# Patient Record
Sex: MALE | Race: WHITE | URBAN - METROPOLITAN AREA
[De-identification: names, ages, dates, MRNs, and addresses within clinical notes are randomized per-mention and may not be internally consistent; named-entity substitution may affect disease eponyms.]

---

## 2018-08-02 ENCOUNTER — OFFICE VISIT (OUTPATIENT)
Dept: FAMILY MEDICINE | Facility: CLINIC | Age: 38
End: 2018-08-02
Payer: COMMERCIAL

## 2018-08-02 ENCOUNTER — NURSE TRIAGE (OUTPATIENT)
Dept: NURSING | Facility: CLINIC | Age: 38
End: 2018-08-02

## 2018-08-02 VITALS
SYSTOLIC BLOOD PRESSURE: 127 MMHG | DIASTOLIC BLOOD PRESSURE: 80 MMHG | BODY MASS INDEX: 29.66 KG/M2 | TEMPERATURE: 98.7 F | HEART RATE: 60 BPM | OXYGEN SATURATION: 100 % | WEIGHT: 219 LBS | HEIGHT: 72 IN

## 2018-08-02 DIAGNOSIS — R10.30 INGUINAL PAIN, UNSPECIFIED LATERALITY: Primary | ICD-10-CM

## 2018-08-02 PROCEDURE — 99203 OFFICE O/P NEW LOW 30 MIN: CPT | Performed by: NURSE PRACTITIONER

## 2018-08-02 ASSESSMENT — PAIN SCALES - GENERAL: PAINLEVEL: NO PAIN (1)

## 2018-08-02 NOTE — TELEPHONE ENCOUNTER
"\"I was playing softball and I thought I hurt my hip.\" Inury occurring 1 month ago after \"stepping and throwing.\" Reporting left hip into left lower abdominal area. Constant low level pain that increases with movement. Afebrile. Pain increasing since Tuesday 7/31/18 after playing softball again.      Per guidelines advised to be seen with in 4 hours. Caller verbalized understanding. Denies further questions.    Bella Ledesma RN  [facility]      Reason for Disposition    [1] MILD-MODERATE pain AND [2] constant AND [3] present > 2 hours    Additional Information    Negative: Serious injury with multiple fractures    Negative: [1] Major bleeding (e.g., actively dripping or spurting) AND [2] can't be stopped    Negative: Bullet wound, stabbed by knife, or other serious penetrating wound    Negative: Looks like a dislocated joint (crooked or deformed)    Negative: Can't stand (bear weight) or walk    Negative: Sounds like a life-threatening emergency to the triager    Negative: Wound looks infected    Negative: Puncture wound of hip area    Negative: Skin is split open or gaping  (or length > 1/2 inch or 12 mm)    Negative: [1] Bleeding AND [2] won't stop after 10 minutes of direct pressure (using correct technique)    Negative: [1] Dirt in the wound AND [2] not removed with 15 minutes of scrubbing    Negative: Sounds like a serious injury to the triager    Negative: [1] SEVERE pain AND [2] not improved 2 hours after pain medicine/ice packs    Negative: Suspicious history for the injury    Negative: [1] Large swelling or bruise (> 2 inches or 5 cm) AND [2] able to bear weight    Negative: [1] High-risk adult (e.g., age > 60, osteoporosis, chronic steroid use) AND [2] limping    Negative: [1] Last tetanus shot > 5 years ago AND [2] DIRTY cut or scrape    Negative: Shock suspected (e.g., cold/pale/clammy skin, too weak to stand, low BP, rapid pulse)    Negative: Difficult to awaken or acting confused  (e.g., " "disoriented, slurred speech)    Negative: Passed out (i.e., lost consciousness, collapsed and was not responding)    Negative: Sounds like a life-threatening emergency to the triager    Negative: Chest pain    Negative: Pain is mainly in upper abdomen  (if needed ask: \"is it mainly above the belly button?\")    Negative: Followed an abdomen (stomach) injury    Negative: [1] SEVERE pain (e.g., excruciating) AND [2] present > 1 hour    Negative: [1] SEVERE pain AND [2] age > 60    Negative: [1] Vomiting AND [2] contains red blood or black (\"coffee ground\") material  (Exception: few red streaks in vomit that only happened once)    Negative: Blood in bowel movements  (Exception: Blood on surface of BM with constipation)    Negative: Black or tarry bowel movements  (Exception: chronic-unchanged  black-grey bowel movements AND is taking iron pills or Pepto-bismol)    Negative: [1] Unable to urinate (or only a few drops) > 4 hours AND     [2] bladder feels very full (e.g., palpable bladder or strong urge to urinate)    Negative: [1] Pain in the scrotum or testicle AND [2] present > 1 hour    Negative: Patient sounds very sick or weak to the triager    Protocols used: ABDOMINAL PAIN - MALE-ADULT-, HIP INJURY-ADULT-      "

## 2018-08-02 NOTE — PROGRESS NOTES
"HPI    SUBJECTIVE:   Cj Brown is a 37 year old male who presents to clinic today for the following health issues:      Abdominal Pain      Duration: 1 month    Description (location/character/radiation): pelvic area       Associated flank pain: None    Intensity:  moderate    Accompanying signs and symptoms:        Fever/Chills: no        Gas/Bloating: no        Nausea/vomitting: no        Diarrhea: no        Dysuria or Hematuria: no     History (previous similar pain/trauma/previous testing): stretched wrong playing soft ball    Precipitating or alleviating factors:       Pain worse with eating/BM/urination: none       Pain relieved by BM: no     Therapies tried and outcome: None    LMP:  not applicable    A month ago, injured his hip while playing softball   Running, stepped on a base and threw the ball, injured left hip  Aggravated by running   Played softball again on Tuesday night and it hurts worse now   Hasn't tried anything to make it better except repositioning   Can't pick leg up while laying flat   Left hip and lower abdominal pain   No nausea, fevers   No changes in bowel movements   Although tries not to strain d/t pain   Urinating normally   No physical changes in the area   No back pain   No pain in left leg but leg feels weak while walking       No past medical history on file.  No family history on file.  No past surgical history on file.  Social History   Substance Use Topics     Smoking status: Never Smoker     Smokeless tobacco: Never Used     Alcohol use Yes     No current outpatient prescriptions on file.     No Known Allergies    Reviewed and updated as needed this visit by clinical staff and provider     ROS  Detailed as above     /80 (BP Location: Right arm, Cuff Size: Adult Regular)  Pulse 60  Temp 98.7  F (37.1  C) (Tympanic)  Ht 5' 11.5\" (1.816 m)  Wt 219 lb (99.3 kg)  SpO2 100%  BMI 30.12 kg/m2      Physical Exam   Constitutional: He is oriented to person, place, and " time and well-developed, well-nourished, and in no distress.   HENT:   Head: Normocephalic.   Eyes: Conjunctivae are normal.   Neck: Normal range of motion.   Cardiovascular: Normal rate.    Pulmonary/Chest: Effort normal.   Abdominal: Soft. Bowel sounds are normal. He exhibits no distension. There is tenderness (right and left inguinal regions). There is no rebound and no guarding.   Neurological: He is alert and oriented to person, place, and time.   Skin: Skin is warm and dry.   Psychiatric: Mood and affect normal.           Assessment and Plan:       ICD-10-CM    1. Inguinal pain, unspecified laterality R10.30 ORTHOPEDICS ADULT REFERRAL       Musculoskeletal pain since softball injury ~1 month ago. No lumps or masses on exam. Recommend evaluation by Orthopedics. Call or return to clinic if questions or worsening symptoms.       Pt seen in conjunction with Katarina Montes, [employer] students.      Aleksandra Leong, APRN, CNP  [facility]

## 2018-08-02 NOTE — MR AVS SNAPSHOT
After Visit Summary   8/2/2018    Cj Brown    MRN: [id number]           Patient Information     Date Of Birth          1980        Visit Information        Provider Department      8/2/2018 10:00 AM Aleksandra Leong APRN CNP [facility]        Today's Diagnoses     Inguinal pain, unspecified laterality    -  1       Follow-ups after your visit        Additional Services     ORTHOPEDICS ADULT REFERRAL       Your provider has referred you to:   FMG: [facility] [phone]   [email or web address]  [facility]: [facility] [place] [facility]  [phone]   [email or web address]/  [place] [place] -  [facility]  [phone]   [email or web address]/    Please be aware that coverage of these services is subject to the terms and limitations of your health insurance plan.  Call member services at your health plan with any benefit or coverage questions.      Please bring the following to your appointment:    >>   Any x-rays, CTs or MRIs which have been performed.  Contact the facility where they were done to arrange for  prior to your scheduled appointment.    >>   List of current medications   >>   This referral request   >>   Any documents/labs given to you for this referral                  Who to contact     If you have questions or need follow up information about today's clinic visit or your schedule please contact [facility] directly at [phone].  Normal or non-critical lab and imaging results will be communicated to you by MyChart, letter or phone within 4 business days after the clinic has received the results. If you do not hear from us within 7 days, please contact the clinic through MyChart or phone. If you have a  "critical or abnormal lab result, we will notify you by phone as soon as possible.  Submit refill requests through [employer] or call your pharmacy and they will forward the refill request to us. Please allow 3 business days for your refill to be completed.          Additional Information About Your Visit        MyChart Information     [employer] lets you send messages to your doctor, view your test results, renew your prescriptions, schedule appointments and more. To sign up, go to [email or web address] . Click on \"Log in\" on the left side of the screen, which will take you to the Welcome page. Then click on \"Sign up Now\" on the right side of the page.     You will be asked to enter the access code listed below, as well as some personal information. Please follow the directions to create your username and password.     Your access code is: [email or web address]  Expires: 10/31/2018  9:58 AM     Your access code will  in 90 days. If you need help or a new code, please call your [place] clinic or [phone].        Care EveryWhere ID     This is your [facility] EveryWhere ID. This could be used by other organizations to access your [place] medical records  [id number]        Your Vitals Were     Pulse Temperature Height Pulse Oximetry BMI (Body Mass Index)       60 98.7  F (37.1  C) (Tympanic) 5' 11.5\" (1.816 m) 100% 30.12 kg/m2        Blood Pressure from Last 3 Encounters:   18 127/80    Weight from Last 3 Encounters:   18 219 lb (99.3 kg)              We Performed the Following     ORTHOPEDICS ADULT REFERRAL        Primary Care Provider Office Phone # Fax #    [facility] [phone] [address] [place] [address]        Equal Access to Services     MAURY VIVEROS : Hadii kevan Bueno, waaxda luqadaha, qaybta kaalmada harperyajuanjose, dalton quinones. So [facility] [phone].    ATENCIÓN: Si habla español, tiene a nunes disposición servicios " alejo de asistencia lingüística. Brittany packer [phone].    We comply with applicable federal civil rights laws and [place] laws. We do not discriminate on the basis of race, color, national origin, age, disability, sex, sexual orientation, or gender identity.            Thank you!     Thank you for choosing [facility]  for your care. Our goal is always to provide you with excellent care. Hearing back from our patients is one way we can continue to improve our services. Please take a few minutes to complete the written survey that you may receive in the mail after your visit with us. Thank you!             Your Updated Medication List - Protect others around you: Learn how to safely use, store and throw away your medicines at www.disposemymeds.org.      Notice  As of 8/2/2018 10:39 AM    You have not been prescribed any medications.

## 2018-08-06 ENCOUNTER — RADIANT APPOINTMENT (OUTPATIENT)
Dept: GENERAL RADIOLOGY | Facility: CLINIC | Age: 38
End: 2018-08-06
Attending: PEDIATRICS
Payer: COMMERCIAL

## 2018-08-06 ENCOUNTER — OFFICE VISIT (OUTPATIENT)
Dept: ORTHOPEDICS | Facility: CLINIC | Age: 38
End: 2018-08-06
Payer: COMMERCIAL

## 2018-08-06 VITALS
DIASTOLIC BLOOD PRESSURE: 81 MMHG | WEIGHT: 219 LBS | HEIGHT: 72 IN | BODY MASS INDEX: 29.66 KG/M2 | SYSTOLIC BLOOD PRESSURE: 126 MMHG

## 2018-08-06 DIAGNOSIS — M25.552 PAIN IN JOINT INVOLVING LEFT PELVIC REGION AND THIGH: ICD-10-CM

## 2018-08-06 DIAGNOSIS — K40.90 UNILATERAL INGUINAL HERNIA WITHOUT OBSTRUCTION OR GANGRENE, RECURRENCE NOT SPECIFIED: ICD-10-CM

## 2018-08-06 DIAGNOSIS — R10.32 LEFT GROIN PAIN: Primary | ICD-10-CM

## 2018-08-06 PROCEDURE — 73502 X-RAY EXAM HIP UNI 2-3 VIEWS: CPT

## 2018-08-06 PROCEDURE — 99204 OFFICE O/P NEW MOD 45 MIN: CPT | Performed by: PEDIATRICS

## 2018-08-06 NOTE — PROGRESS NOTES
"Sports Medicine Clinic Visit    PCP: Cassandra, [facility]    Cj Brown is a 37 year old male who is seen  in consultation at the request of  Aleksandra Leong C.N.P. presenting with left hip pain    Injury: Patient reports pain started when stepping to thrown in baseball game.  He reports his externally rotated while throwing.  His pain is now in his lower abdomen and groin, increases with any movement, a lot worse with coughing and sneezing.  - Also worsened recently with another throw when trying to return to softball.  - Denies bowl or bladder complaints, no vomiting, fevers or chills    Location of Pain: left hip/ abdomen  Duration of Pain: 1 month(s)  Rating of Pain at worst: 10/10, intermittent with movement  Rating of Pain Currently: 3/10  Symptoms are better with: rest, activity avoidance   Symptoms are worse with: sprinting, sneezing, SLR with left hip  Additional Features:   Positive: popping   Negative: grinding, catching, locking and instability  Other evaluation and/or treatments so far consists of: Nothing  Prior History of related problems: none    Social History: baseball, desk job    Review of Systems  Skin: no bruising, no swelling  Musculoskeletal: as above  Neurologic: no numbness, paresthesias  Remainder of review of systems is negative including constitutional, CV, pulmonary, GI, except as noted in HPI or medical history.    Patient's current problem list, past medical and surgical history, and family history were reviewed.    There is no problem list on file for this patient.    No past medical history on file.  No past surgical history on file.  No family history on file.    Objective  /81 (BP Location: Left arm, Patient Position: Chair, Cuff Size: Adult Regular)  Ht 5' 11.5\" (1.816 m)  Wt 219 lb (99.3 kg)  BMI 30.12 kg/m2    GENERAL APPEARANCE: healthy, alert and no distress   GAIT: NORMAL  SKIN: no suspicious lesions or rashes  HEENT: Sclera clear, anicteric  CV: " no lower extremity edema, good peripheral pulses  RESP: Breathing not labored  NEURO: Normal strength and tone, mentation intact and speech normal  PSYCH:  mentation appears normal and affect normal/bright    Abdominal Exam:  - no hepatosplenomegaly or masses  - slightly tender to palpation throughout lower abdomen     Exam:  - no lumps or masses felt in testicles  - with increased abdominal pressure, hernia felt in deep inguinal ring on the left    Bilateral hip exam  Inspection:      no edema or ecchymosis in hip area    Tender:      groin left    Non Tender:      remainder of the hip area bilateral    ROM:     Full active and passive ROM  Bilateral, however, pain will all hip ROM    Strength:      flexion 3/5 left due to pain       abduction 5/5 bilateral       adduction 5/5 bilateral    Sensation:      grossly intact in hip and thigh    Radiology  I ordered, visualized and reviewed these images with the patient  Xr Pelvis And Hip Left 1 View    Result Date: 8/7/2018  PELVIS ONE VIEW AND LEFT HIP ONE VIEW  8/6/2018 5:46 PM HISTORY:  Pain. COMPARISON:  None. FINDINGS:  No fracture or osseous lesion is seen. The joint spaces are well preserved. No adjacent soft tissue pathology is seen.     IMPRESSION:  Unremarkable examination. PATRICA CORTEZ MD    Assessment:  1. Left groin pain    2. Unilateral inguinal hernia without obstruction or gangrene, recurrence not specified      Left groin pain with evidence of left inguinal hernia.  I recommend surgical referral.  Pending this evaluation could consider PT for abdominal muscle or hip flexor strain or MRI imaging of the hip, however, his symptoms are most consistent with hernia.  I discussed concerning signs and symptoms in the interim and reasons to go to the emergency room    Plan:  - Today's Plan of Care:  Referral to General Surgery    -We also discussed other future treatment options:  MRI of the Hip or Physical therapy    Follow Up: as needed    Concerning  signs and symptoms were reviewed.  The patient expressed understanding of this management plan and all questions were answered at this time.    Bella Duron MD CA  Primary Care Sports Medicine  [facility]

## 2018-08-06 NOTE — PATIENT INSTRUCTIONS
Plan:  - Today's Plan of Care:  Referral to General Surgery    -We also discussed other future treatment options:  MRI of the Hip or Physical therapy    Follow Up: as needed    If you have any further questions for your physician or physician s care team you can call [phone] and use option 3 to leave a voice message. Calls received during business hours will be returned same day.

## 2018-08-06 NOTE — MR AVS SNAPSHOT
After Visit Summary   8/6/2018    Cj Brown    MRN: [id number]           Patient Information     Date Of Birth          1980        Visit Information        Provider Department      8/6/2018 5:20 PM Bella Duron MD [facility]        Today's Diagnoses     Left groin pain    -  1    Unilateral inguinal hernia without obstruction or gangrene, recurrence not specified          Care Instructions    Plan:  - Today's Plan of Care:  Referral to General Surgery    -We also discussed other future treatment options:  MRI of the Hip or Physical therapy    Follow Up: as needed    If you have any further questions for your physician or physician s care team you can call [phone] and use option 3 to leave a voice message. Calls received during business hours will be returned same day.              Follow-ups after your visit        Additional Services     GENERAL SURG ADULT REFERRAL       Your provider has referred you to: FMG: [facility] [phone]   [email or web address]  FMG: [facility] [place] [phone]   [email or web address]/  [facility]: [facility] [place] [phone]   [email or web address]  FMG: [facility] [phone]   [email or web address]  FMG: [facility] [phone]   [email or web address]/    Please be aware that coverage of these services is subject to the terms and limitations of your health insurance plan.  Call member services at your health plan with any benefit or coverage questions.      Please bring the following with you to your appointment:    (1) Any X-Rays, CTs or MRIs which have been performed.  Contact the facility where they were done to arrange for  prior to your scheduled appointment.   (2) List of  "current medications   (3) This referral request   (4) Any documents/labs given to you for this referral                  Who to contact     If you have questions or need follow up information about today's clinic visit or your schedule please contact [facility] directly at [phone].  Normal or non-critical lab and imaging results will be communicated to you by MyChart, letter or phone within 4 business days after the clinic has received the results. If you do not hear from us within 7 days, please contact the clinic through [employer] or phone. If you have a critical or abnormal lab result, we will notify you by phone as soon as possible.  Submit refill requests through [employer] or call your pharmacy and they will forward the refill request to us. Please allow 3 business days for your refill to be completed.          Additional Information About Your Visit        MyChart Information     [employer] gives you secure access to your electronic health record. If you see a primary care provider, you can also send messages to your care team and make appointments. If you have questions, please call your primary care clinic.  If you do not have a primary care provider, please call [phone] and they will assist you.        Care EveryWhere ID     This is your Care EveryWhere ID. This could be used by other organizations to access your [place] medical records  [id number]        Your Vitals Were     Height BMI (Body Mass Index)                5' 11.5\" (1.816 m) 30.12 kg/m2           Blood Pressure from Last 3 Encounters:   08/06/18 126/81   08/02/18 127/80    Weight from Last 3 Encounters:   08/06/18 219 lb (99.3 kg)   08/02/18 219 lb (99.3 kg)              We Performed the Following     GENERAL SURG ADULT REFERRAL        Primary Care Provider Office Phone # Fax #    [facility] [phone] [address] [place] [address]        Equal Access to " Services     [facility] Hospital[facility] : Hadii kevan Bueno, wamarcda elsaadaha, qapeggy cruzaljoaquim de la o, dalton quinones. So [facility] [phone].    ATENCIÓN: Si habla español, tiene a nunes disposición servicios gratuitos de asistencia lingüística. Llame al [phone].    We comply with applicable federal civil rights laws and [place] laws. We do not discriminate on the basis of race, color, national origin, age, disability, sex, sexual orientation, or gender identity.            Thank you!     Thank you for choosing [facility] [facility]  for your care. Our goal is always to provide you with excellent care. Hearing back from our patients is one way we can continue to improve our services. Please take a few minutes to complete the written survey that you may receive in the mail after your visit with us. Thank you!             Your Updated Medication List - Protect others around you: Learn how to safely use, store and throw away your medicines at www.disposemymeds.org.      Notice  As of 8/6/2018  6:22 PM    You have not been prescribed any medications.

## 2018-08-06 NOTE — LETTER
8/6/2018         RE: Cj Brown  [id number] [place] [address]        Dear Colleague,    Thank you for referring your patient, Cj Brown, to the [facility]. Please see a copy of my visit note below.    Sports Medicine Clinic Visit    PCP: [facility]    Cj Brown is a 37 year old male who is seen  in consultation at the request of  Aleksandra Leong C.N.P. presenting with left hip pain    Injury: Patient reports pain started when stepping to thrown in baseball game.  He reports his externally rotated while throwing.  His pain is now in his lower abdomen and groin, increases with any movement, a lot worse with coughing and sneezing.  - Also worsened recently with another throw when trying to return to softball.  - Denies bowl or bladder complaints, no vomiting, fevers or chills    Location of Pain: left hip/ abdomen  Duration of Pain: 1 month(s)  Rating of Pain at worst: 10/10, intermittent with movement  Rating of Pain Currently: 3/10  Symptoms are better with: rest, activity avoidance   Symptoms are worse with: sprinting, sneezing, SLR with left hip  Additional Features:   Positive: popping   Negative: grinding, catching, locking and instability  Other evaluation and/or treatments so far consists of: Nothing  Prior History of related problems: none    Social History: baseball, desk job    Review of Systems  Skin: no bruising, no swelling  Musculoskeletal: as above  Neurologic: no numbness, paresthesias  Remainder of review of systems is negative including constitutional, CV, pulmonary, GI, except as noted in HPI or medical history.    Patient's current problem list, past medical and surgical history, and family history were reviewed.    There is no problem list on file for this patient.    No past medical history on file.  No past surgical history on file.  No family history on file.    Objective  /81 (BP Location: Left arm,  "Patient Position: Chair, Cuff Size: Adult Regular)  Ht 5' 11.5\" (1.816 m)  Wt 219 lb (99.3 kg)  BMI 30.12 kg/m2    GENERAL APPEARANCE: healthy, alert and no distress   GAIT: NORMAL  SKIN: no suspicious lesions or rashes  HEENT: Sclera clear, anicteric  CV: no lower extremity edema, good peripheral pulses  RESP: Breathing not labored  NEURO: Normal strength and tone, mentation intact and speech normal  PSYCH:  mentation appears normal and affect normal/bright    Abdominal Exam:  - no hepatosplenomegaly or masses  - slightly tender to palpation throughout lower abdomen     Exam:  - no lumps or masses felt in testicles  - with increased abdominal pressure, hernia felt in deep inguinal ring on the left    Bilateral hip exam  Inspection:      no edema or ecchymosis in hip area    Tender:      groin left    Non Tender:      remainder of the hip area bilateral    ROM:     Full active and passive ROM  Bilateral, however, pain will all hip ROM    Strength:      flexion 3/5 left due to pain       abduction 5/5 bilateral       adduction 5/5 bilateral    Sensation:      grossly intact in hip and thigh    Radiology  I ordered, visualized and reviewed these images with the patient  Xr Pelvis And Hip Left 1 View    Result Date: 8/7/2018  PELVIS ONE VIEW AND LEFT HIP ONE VIEW  8/6/2018 5:46 PM HISTORY:  Pain. COMPARISON:  None. FINDINGS:  No fracture or osseous lesion is seen. The joint spaces are well preserved. No adjacent soft tissue pathology is seen.     IMPRESSION:  Unremarkable examination. PATRICA CORTEZ MD    Assessment:  1. Left groin pain    2. Unilateral inguinal hernia without obstruction or gangrene, recurrence not specified      Left groin pain with evidence of left inguinal hernia.  I recommend surgical referral.  Pending this evaluation could consider PT for abdominal muscle or hip flexor strain or MRI imaging of the hip, however, his symptoms are most consistent with hernia.  I discussed concerning signs " and symptoms in the interim and reasons to go to the emergency room    Plan:  - Today's Plan of Care:  Referral to General Surgery    -We also discussed other future treatment options:  MRI of the Hip or Physical therapy    Follow Up: as needed    Concerning signs and symptoms were reviewed.  The patient expressed understanding of this management plan and all questions were answered at this time.    Bella Duron MD [facility]  Primary Care Sports Medicine  [facility]    Again, thank you for allowing me to participate in the care of your patient.        Sincerely,        Bella Duron MD

## 2018-08-15 ENCOUNTER — OFFICE VISIT (OUTPATIENT)
Dept: SURGERY | Facility: CLINIC | Age: 38
End: 2018-08-15
Payer: COMMERCIAL

## 2018-08-15 VITALS
HEIGHT: 71 IN | DIASTOLIC BLOOD PRESSURE: 84 MMHG | BODY MASS INDEX: 30.52 KG/M2 | WEIGHT: 218 LBS | HEART RATE: 66 BPM | SYSTOLIC BLOOD PRESSURE: 145 MMHG

## 2018-08-15 DIAGNOSIS — R10.32 LEFT GROIN PAIN: Primary | ICD-10-CM

## 2018-08-15 PROCEDURE — 99203 OFFICE O/P NEW LOW 30 MIN: CPT | Performed by: SURGERY

## 2018-08-15 NOTE — PROGRESS NOTES
HPI:  Cj is a 37 year old male who presents for evaluation of left groin pain.  Symptoms began  3months  Ago after a softball injury.  This is described as stabbing. The pain occurs with flexing. 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 The pain recurred.  Associated symptoms include none. The patient has not noticed a bulge. The patient has not had a previous herniorrhaphy in this location. Employment does not require heavy lifting.      Cough: No      Past Medical History:   has no past medical history on file.    Past Surgical History:  History reviewed. No pertinent surgical history.       Social History:  Social History     Social History     Marital status: Single     Spouse name: N/A     Number of children: N/A     Years of education: N/A     Occupational History     Not on file.     Social History Main Topics     Smoking status: Never Smoker     Smokeless tobacco: Never Used     Alcohol use Yes     Drug use: No     Sexual activity: Yes     Other Topics Concern     Not on file     Social History Narrative        Family History:  History reviewed. No pertinent family history.      ROS:  The 10 point review of systems is negative other than noted in the HPI and above.    PE:    General- Well-developed, well-nourished, patient able to stand without difficulty.  HEENT- Normocephalic and atraumatic. Pupils equal and round.  Mucous membranes moist.  Sclera are nonicteric.   Respirations- are regular and non labored  Abdomen is abdomen is soft without significant tenderness, masses, organomegaly or guarding     Umbilicus is non-tender  Hernia- Left inguinal hernia is not present with valsalva              Right inguinal hernia is not present with valsalva        He has a small impulse on each side with coughing, but no definite inguinal hernia.               Testicles are normal   No inguinal lymphadenopathy is present  Neurologic- I find no inguinal neuropathy                   Assesment: no evidence of an inguinal hernia.  Groin strain    Plan:    We discussed how he does not have a hernia, but has a re-injured groin strain. We reviewed conservative treatment for this including anti-inflammatories and ice. We discussed how this should improve with time. He is welcome to return with questions.       Ti Sin MD    Please route or send letter to:  Primary Care Provider (PCP), also Bella Duron MD

## 2018-08-15 NOTE — MR AVS SNAPSHOT
"              After Visit Summary   8/15/2018    Cj Brown    MRN: [id number]           Patient Information     Date Of Birth          1980        Visit Information        Provider Department      8/15/2018 10:30 AM Ti Sin MD [facility]      Care Instructions    Patient to follow up with Primary Care provider regarding elevated blood pressure.          Follow-ups after your visit        Who to contact     If you have questions or need follow up information about today's clinic visit or your schedule please contact [facility] directly at [phone].  Normal or non-critical lab and imaging results will be communicated to you by Cubitohart, letter or phone within 4 business days after the clinic has received the results. If you do not hear from us within 7 days, please contact the clinic through [employer] networkt or phone. If you have a critical or abnormal lab result, we will notify you by phone as soon as possible.  Submit refill requests through PrintFu or call your pharmacy and they will forward the refill request to us. Please allow 3 business days for your refill to be completed.          Additional Information About Your Visit        MyChart Information     [employer] gives you secure access to your electronic health record. If you see a primary care provider, you can also send messages to your care team and make appointments. If you have questions, please call your primary care clinic.  If you do not have a primary care provider, please call [phone] and they will assist you.        Care EveryWhere ID     This is your Care EveryWhere ID. This could be used by other organizations to access your [facility] medical records  [id number]        Your Vitals Were     Pulse Height BMI (Body Mass Index)             66 5' 11\" (1.803 m) 30.4 kg/m2          Blood Pressure from Last 3 Encounters:   08/15/18 145/84   08/06/18 126/81 "   08/02/18 127/80    Weight from Last 3 Encounters:   08/15/18 218 lb (98.9 kg)   08/06/18 219 lb (99.3 kg)   08/02/18 219 lb (99.3 kg)              Today, you had the following     No orders found for display       Primary Care Provider Fax #    Physician No Ref-Primary [phone]       No address on file        Equal Access to Services     [facility] campusJAVIER : Hadii kevan ku hadasho Sozachali, waaxda luqadaha, qaybta kaalmada adeemilyada, dalton whitmorepatriciotiffanie cormier . So [facility] [phone].    ATENCIÓN: Si habla español, tiene a nunes disposición servicios gratuitos de asistencia lingüística. Llame al [phone].    We comply with applicable federal civil rights laws and [place] laws. We do not discriminate on the basis of race, color, national origin, age, disability, sex, sexual orientation, or gender identity.            Thank you!     Thank you for choosing [facility]  for your care. Our goal is always to provide you with excellent care. Hearing back from our patients is one way we can continue to improve our services. Please take a few minutes to complete the written survey that you may receive in the mail after your visit with us. Thank you!             Your Updated Medication List - Protect others around you: Learn how to safely use, store and throw away your medicines at www.disposemymeds.org.      Notice  As of 8/15/2018 11:13 AM    You have not been prescribed any medications.

## 2019-09-30 ENCOUNTER — HEALTH MAINTENANCE LETTER (OUTPATIENT)
Age: 39
End: 2019-09-30

## 2021-01-15 ENCOUNTER — HEALTH MAINTENANCE LETTER (OUTPATIENT)
Age: 41
End: 2021-01-15

## 2021-10-24 ENCOUNTER — HEALTH MAINTENANCE LETTER (OUTPATIENT)
Age: 41
End: 2021-10-24

## 2022-02-13 ENCOUNTER — HEALTH MAINTENANCE LETTER (OUTPATIENT)
Age: 42
End: 2022-02-13

## 2022-10-16 ENCOUNTER — HEALTH MAINTENANCE LETTER (OUTPATIENT)
Age: 42
End: 2022-10-16

## 2023-03-26 ENCOUNTER — HEALTH MAINTENANCE LETTER (OUTPATIENT)
Age: 43
End: 2023-03-26